# Patient Record
Sex: FEMALE | Race: BLACK OR AFRICAN AMERICAN | URBAN - METROPOLITAN AREA
[De-identification: names, ages, dates, MRNs, and addresses within clinical notes are randomized per-mention and may not be internally consistent; named-entity substitution may affect disease eponyms.]

---

## 2024-06-04 ENCOUNTER — OFFICE VISIT (OUTPATIENT)
Dept: ORTHOPEDIC SURGERY | Age: 69
End: 2024-06-04
Payer: MEDICARE

## 2024-06-04 DIAGNOSIS — M19.072 PRIMARY OSTEOARTHRITIS OF BOTH FEET: Primary | ICD-10-CM

## 2024-06-04 DIAGNOSIS — M19.071 PRIMARY OSTEOARTHRITIS OF BOTH FEET: Primary | ICD-10-CM

## 2024-06-04 PROCEDURE — 99212 OFFICE O/P EST SF 10 MIN: CPT | Performed by: ORTHOPAEDIC SURGERY

## 2024-06-04 PROCEDURE — 1123F ACP DISCUSS/DSCN MKR DOCD: CPT | Performed by: ORTHOPAEDIC SURGERY

## 2024-06-04 RX ORDER — LOSARTAN POTASSIUM AND HYDROCHLOROTHIAZIDE 12.5; 1 MG/1; MG/1
1 TABLET ORAL DAILY
COMMUNITY

## 2024-06-04 NOTE — PROGRESS NOTES
Name: Marlene Garcia  YOB: 1955  Gender: female  MRN: [id number]    06/04/2024: A little sore with the rainy weather but otherwise doing okay.    HPI:   05/14/2024: Initial visit: Bilateral chronic foot pain: Prior treatment [facility]    ROS/Meds/PSH/PMH/FH/SH: reviewed today    Tobacco:  reports that she has never smoked. She has never used smokeless tobacco.     Physical Examination:  Patient appears to be alert and oriented with acceptable appearance.  No obvious distress or SOB  CV: appears to have acceptable vascular color and capillary refill  Neuro: appears to have mostly intact light touch sensation   Skin: Bilateral = no soft tissue swelling; prominent accessory navicular  MS: Standing: Pes planus: Gait full  Bilateral = no reproducible pain  Bilateral = no PTT, accessory navicular or peroneal pain  Bilateral = full ankle/foot motion; 5/5 strength; mild hindfoot instability; no crepitance    XR: Right: Left: Standing AP lateral mortise ankle plus AP oblique foot taken 05/14/2024 with accessory navicular: Planovalgus: Hindfoot arthritis  XR Impression:  As above      Reviewed Test/Records/Documents:   08/05/2022: Left ankle MRI scan [facility]: Radiology impression:  1.  Low-grade partial tear involving the supramalleolar and inframalleolar posterior tibialis tendon with tendinosis and moderate tenosynovitis  2.  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  No findings of edema or cystic change within the medial navicular adjacent to the synchondrosis, however  3.  Moderate talonavicular joint effusion with findings of synovitis.  No findings of osteochondral lesion or significant degenerative change  4.  Sequela of prior ligamentous sprain involving superficial Deltoid and anterior talofibular ligament    08/24/2022: Dr. Pascal: Reflects posterior tendon and accessory navicular with symptomatic the talonavicular joint: Discussed  Speak loud

## 2025-03-24 ENCOUNTER — TELEPHONE (OUTPATIENT)
Dept: ORTHOPEDIC SURGERY | Age: 70
End: 2025-03-24

## 2025-03-24 NOTE — TELEPHONE ENCOUNTER
Called and lvm for pt , to please return my call regarding her apt with .  does not treat swelling.